# Patient Record
Sex: MALE | URBAN - METROPOLITAN AREA
[De-identification: names, ages, dates, MRNs, and addresses within clinical notes are randomized per-mention and may not be internally consistent; named-entity substitution may affect disease eponyms.]

---

## 2018-03-26 ENCOUNTER — APPOINTMENT (RX ONLY)
Dept: URBAN - METROPOLITAN AREA CLINIC 95 | Facility: CLINIC | Age: 83
Setting detail: DERMATOLOGY
End: 2018-03-26

## 2018-03-26 DIAGNOSIS — L30.9 DERMATITIS, UNSPECIFIED: ICD-10-CM

## 2018-03-26 PROBLEM — I10 ESSENTIAL (PRIMARY) HYPERTENSION: Status: ACTIVE | Noted: 2018-03-26

## 2018-03-26 PROBLEM — E13.9 OTHER SPECIFIED DIABETES MELLITUS WITHOUT COMPLICATIONS: Status: ACTIVE | Noted: 2018-03-26

## 2018-03-26 PROBLEM — K21.9 GASTRO-ESOPHAGEAL REFLUX DISEASE WITHOUT ESOPHAGITIS: Status: ACTIVE | Noted: 2018-03-26

## 2018-03-26 PROCEDURE — 99202 OFFICE O/P NEW SF 15 MIN: CPT

## 2018-03-26 PROCEDURE — ? COUNSELING

## 2018-03-26 PROCEDURE — ? PRESCRIPTION

## 2018-03-26 RX ORDER — CLOBETASOL PROPIONATE 0.5 MG/G
OINTMENT TOPICAL
Qty: 1 | Refills: 0 | COMMUNITY
Start: 2018-03-26

## 2018-03-26 RX ADMIN — CLOBETASOL PROPIONATE: 0.5 OINTMENT TOPICAL at 15:17

## 2018-04-09 ENCOUNTER — APPOINTMENT (RX ONLY)
Dept: URBAN - METROPOLITAN AREA CLINIC 95 | Facility: CLINIC | Age: 83
Setting detail: DERMATOLOGY
End: 2018-04-09

## 2018-04-09 DIAGNOSIS — L24 IRRITANT CONTACT DERMATITIS: ICD-10-CM

## 2018-04-09 PROBLEM — L30.9 DERMATITIS, UNSPECIFIED: Status: ACTIVE | Noted: 2018-04-09

## 2018-04-09 PROCEDURE — ? BIOPSY BY SHAVE METHOD

## 2018-04-09 PROCEDURE — 11100: CPT

## 2018-04-09 ASSESSMENT — LOCATION DETAILED DESCRIPTION DERM: LOCATION DETAILED: RIGHT SUPERIOR MEDIAL UPPER BACK

## 2018-04-09 ASSESSMENT — LOCATION SIMPLE DESCRIPTION DERM: LOCATION SIMPLE: RIGHT UPPER BACK

## 2018-04-09 ASSESSMENT — LOCATION ZONE DERM: LOCATION ZONE: TRUNK

## 2018-04-09 NOTE — PROCEDURE: BIOPSY BY SHAVE METHOD
Post-Care Instructions: A biopsy of your skin has been taken to accurately diagnose your problem. The results will be sent to our office within 2 weeks.\\n\\nIt is a small wound which could poosibly leave a small scar. Your biopsy site should be cleansed twice per day.\\n1. Wash your hands with soap & water.\\n2. Use mild soap & water to gently clean the wound.\\n3. Rinse with water.\\n4. Apply a thin layer of vaseline / petrolium jelly.\\n\\nThis should be done at least 2 times per day until the biopsy site appears to have completely healed. Try not to let a scab form by keeping the site clean & moist with the vaseline/petrolium jelly for faster healing.\\n\\nWe will call you when we receive your biopsy results. If you should have concerns, feel free to call our office at [phone]. Post-Care Instructions: A biopsy of your skin has been taken to accurately diagnose your problem. The results will be sent to our office within 2 weeks.\\n\\nIt is a small wound which could poosibly leave a small scar. Your biopsy site should be cleansed twice per day.\\n1. Wash your hands with soap & water.\\n2. Use mild soap & water to gently clean the wound.\\n3. Rinse with water.\\n4. Apply a thin layer of vaseline / petrolium jelly.\\n\\nThis should be done at least 2 times per day until the biopsy site appears to have completely healed. Try not to let a scab form by keeping the site clean & moist with the vaseline/petrolium jelly for faster healing.\\n\\nWe will call you when we receive your biopsy results. If you should have concerns, feel free to call our office at [phone].

## 2018-04-16 ENCOUNTER — APPOINTMENT (RX ONLY)
Dept: URBAN - METROPOLITAN AREA CLINIC 95 | Facility: CLINIC | Age: 83
Setting detail: DERMATOLOGY
End: 2018-04-16

## 2018-04-30 ENCOUNTER — APPOINTMENT (RX ONLY)
Dept: URBAN - METROPOLITAN AREA CLINIC 95 | Facility: CLINIC | Age: 83
Setting detail: DERMATOLOGY
End: 2018-04-30

## 2018-04-30 DIAGNOSIS — L20.89 OTHER ATOPIC DERMATITIS: ICD-10-CM

## 2018-04-30 PROBLEM — L20.84 INTRINSIC (ALLERGIC) ECZEMA: Status: ACTIVE | Noted: 2018-04-30

## 2018-04-30 PROCEDURE — 99213 OFFICE O/P EST LOW 20 MIN: CPT

## 2018-04-30 PROCEDURE — ? PRESCRIPTION

## 2018-04-30 PROCEDURE — ? COUNSELING

## 2018-04-30 RX ORDER — HYDROXYZINE HYDROCHLORIDE 10 MG/1
TABLET, FILM COATED ORAL QHS
Qty: 30 | Refills: 1 | Status: ERX | COMMUNITY
Start: 2018-04-30

## 2018-04-30 RX ADMIN — HYDROXYZINE HYDROCHLORIDE: 10 TABLET, FILM COATED ORAL at 15:21
